# Patient Record
Sex: FEMALE | URBAN - METROPOLITAN AREA
[De-identification: names, ages, dates, MRNs, and addresses within clinical notes are randomized per-mention and may not be internally consistent; named-entity substitution may affect disease eponyms.]

---

## 2018-01-25 ENCOUNTER — APPOINTMENT (RX ONLY)
Dept: URBAN - METROPOLITAN AREA CLINIC 157 | Facility: CLINIC | Age: 28
Setting detail: DERMATOLOGY
End: 2018-01-25

## 2018-01-25 VITALS — WEIGHT: 170 LBS | HEIGHT: 63 IN

## 2018-01-25 DIAGNOSIS — L70.8 OTHER ACNE: ICD-10-CM

## 2018-01-25 PROCEDURE — ? COUNSELING

## 2018-01-25 PROCEDURE — 99202 OFFICE O/P NEW SF 15 MIN: CPT

## 2018-01-25 PROCEDURE — ? SEPARATE AND IDENTIFIABLE DOCUMENTATION

## 2018-01-25 PROCEDURE — ? PRESCRIPTION

## 2018-01-25 PROCEDURE — ? RECOMMENDATIONS

## 2018-01-25 RX ORDER — CLINDAMYCIN PHOSPHATE AND TRETINOIN 10; .25 MG/G; MG/G
GEL TOPICAL
Qty: 1 | Refills: 1 | Status: ERX | COMMUNITY
Start: 2018-01-25

## 2018-01-25 RX ORDER — DOXYCYCLINE HYCLATE 100 MG/1
CAPSULE, GELATIN COATED ORAL
Qty: 30 | Refills: 1 | Status: ERX | COMMUNITY
Start: 2018-01-25

## 2018-01-25 RX ADMIN — CLINDAMYCIN PHOSPHATE AND TRETINOIN: 10; .25 GEL TOPICAL at 21:20

## 2018-01-25 RX ADMIN — DOXYCYCLINE HYCLATE: 100 CAPSULE, GELATIN COATED ORAL at 21:19

## 2018-01-25 NOTE — PROCEDURE: RECOMMENDATIONS
Detail Level: Zone
Recommendation Preamble: The following recommendations were made during the visit: CeraVe foaming face wash and CeraVe moisturizer